# Patient Record
Sex: FEMALE | Race: OTHER | NOT HISPANIC OR LATINO
[De-identification: names, ages, dates, MRNs, and addresses within clinical notes are randomized per-mention and may not be internally consistent; named-entity substitution may affect disease eponyms.]

---

## 2024-10-24 ENCOUNTER — ASOB RESULT (OUTPATIENT)
Age: 29
End: 2024-10-24

## 2024-10-24 ENCOUNTER — APPOINTMENT (OUTPATIENT)
Dept: ANTEPARTUM | Facility: CLINIC | Age: 29
End: 2024-10-24
Payer: MEDICAID

## 2024-10-24 PROCEDURE — 76811 OB US DETAILED SNGL FETUS: CPT

## 2024-10-24 PROCEDURE — 76817 TRANSVAGINAL US OBSTETRIC: CPT | Mod: 59

## 2025-01-02 ENCOUNTER — APPOINTMENT (OUTPATIENT)
Dept: ANTEPARTUM | Facility: CLINIC | Age: 30
End: 2025-01-02

## 2025-03-12 ENCOUNTER — INPATIENT (INPATIENT)
Facility: HOSPITAL | Age: 30
LOS: 3 days | Discharge: HOME | End: 2025-03-16
Attending: OBSTETRICS & GYNECOLOGY | Admitting: OBSTETRICS & GYNECOLOGY
Payer: MEDICAID

## 2025-03-12 VITALS
HEART RATE: 84 BPM | TEMPERATURE: 98 F | OXYGEN SATURATION: 100 % | WEIGHT: 145.06 LBS | DIASTOLIC BLOOD PRESSURE: 94 MMHG | SYSTOLIC BLOOD PRESSURE: 125 MMHG | HEIGHT: 60 IN | RESPIRATION RATE: 16 BRPM

## 2025-03-12 LAB
ALBUMIN SERPL ELPH-MCNC: 3.6 G/DL — SIGNIFICANT CHANGE UP (ref 3.3–5)
ALP SERPL-CCNC: 237 U/L — HIGH (ref 40–120)
ALT FLD-CCNC: 9 U/L — LOW (ref 10–45)
ANION GAP SERPL CALC-SCNC: 13 MMOL/L — SIGNIFICANT CHANGE UP (ref 5–17)
APTT BLD: 25.3 SEC — SIGNIFICANT CHANGE UP (ref 24.5–35.6)
AST SERPL-CCNC: 16 U/L — SIGNIFICANT CHANGE UP (ref 10–40)
BASOPHILS # BLD AUTO: 0.02 K/UL — SIGNIFICANT CHANGE UP (ref 0–0.2)
BASOPHILS NFR BLD AUTO: 0.2 % — SIGNIFICANT CHANGE UP (ref 0–2)
BILIRUB SERPL-MCNC: 0.2 MG/DL — SIGNIFICANT CHANGE UP (ref 0.2–1.2)
BUN SERPL-MCNC: 7 MG/DL — SIGNIFICANT CHANGE UP (ref 7–23)
CALCIUM SERPL-MCNC: 8.8 MG/DL — SIGNIFICANT CHANGE UP (ref 8.4–10.5)
CHLORIDE SERPL-SCNC: 102 MMOL/L — SIGNIFICANT CHANGE UP (ref 96–108)
CO2 SERPL-SCNC: 20 MMOL/L — LOW (ref 22–31)
CREAT ?TM UR-MCNC: 26 MG/DL — SIGNIFICANT CHANGE UP
CREAT SERPL-MCNC: 0.5 MG/DL — SIGNIFICANT CHANGE UP (ref 0.5–1.3)
EGFR: 130 ML/MIN/1.73M2 — SIGNIFICANT CHANGE UP
EGFR: 130 ML/MIN/1.73M2 — SIGNIFICANT CHANGE UP
EOSINOPHIL # BLD AUTO: 0.03 K/UL — SIGNIFICANT CHANGE UP (ref 0–0.5)
EOSINOPHIL NFR BLD AUTO: 0.3 % — SIGNIFICANT CHANGE UP (ref 0–6)
GLUCOSE SERPL-MCNC: 80 MG/DL — SIGNIFICANT CHANGE UP (ref 70–99)
HCT VFR BLD CALC: 32.7 % — LOW (ref 34.5–45)
HGB BLD-MCNC: 10.4 G/DL — LOW (ref 11.5–15.5)
IMM GRANULOCYTES NFR BLD AUTO: 0.8 % — SIGNIFICANT CHANGE UP (ref 0–0.9)
INR BLD: 0.86 — SIGNIFICANT CHANGE UP (ref 0.85–1.16)
LDH SERPL L TO P-CCNC: 183 U/L — SIGNIFICANT CHANGE UP (ref 50–242)
LYMPHOCYTES # BLD AUTO: 1.95 K/UL — SIGNIFICANT CHANGE UP (ref 1–3.3)
LYMPHOCYTES # BLD AUTO: 19.7 % — SIGNIFICANT CHANGE UP (ref 13–44)
MCHC RBC-ENTMCNC: 25.7 PG — LOW (ref 27–34)
MCHC RBC-ENTMCNC: 31.8 G/DL — LOW (ref 32–36)
MCV RBC AUTO: 80.9 FL — SIGNIFICANT CHANGE UP (ref 80–100)
MONOCYTES # BLD AUTO: 0.52 K/UL — SIGNIFICANT CHANGE UP (ref 0–0.9)
MONOCYTES NFR BLD AUTO: 5.2 % — SIGNIFICANT CHANGE UP (ref 2–14)
NEUTROPHILS # BLD AUTO: 7.32 K/UL — SIGNIFICANT CHANGE UP (ref 1.8–7.4)
NEUTROPHILS NFR BLD AUTO: 73.8 % — SIGNIFICANT CHANGE UP (ref 43–77)
NRBC BLD AUTO-RTO: 0 /100 WBCS — SIGNIFICANT CHANGE UP (ref 0–0)
PLATELET # BLD AUTO: 204 K/UL — SIGNIFICANT CHANGE UP (ref 150–400)
POTASSIUM SERPL-MCNC: 3.9 MMOL/L — SIGNIFICANT CHANGE UP (ref 3.5–5.3)
POTASSIUM SERPL-SCNC: 3.9 MMOL/L — SIGNIFICANT CHANGE UP (ref 3.5–5.3)
PROT ?TM UR-MCNC: 27 MG/DL — HIGH (ref 0–12)
PROT SERPL-MCNC: 7.1 G/DL — SIGNIFICANT CHANGE UP (ref 6–8.3)
PROT/CREAT UR-RTO: 1 RATIO — HIGH (ref 0–0.2)
PROTHROM AB SERPL-ACNC: 10.1 SEC — SIGNIFICANT CHANGE UP (ref 9.9–13.4)
RBC # BLD: 4.04 M/UL — SIGNIFICANT CHANGE UP (ref 3.8–5.2)
RBC # FLD: 13.9 % — SIGNIFICANT CHANGE UP (ref 10.3–14.5)
RH IG SCN BLD-IMP: POSITIVE — SIGNIFICANT CHANGE UP
RH IG SCN BLD-IMP: POSITIVE — SIGNIFICANT CHANGE UP
SODIUM SERPL-SCNC: 135 MMOL/L — SIGNIFICANT CHANGE UP (ref 135–145)
T PALLIDUM AB TITR SER: NEGATIVE — SIGNIFICANT CHANGE UP
URATE SERPL-MCNC: 4.4 MG/DL — SIGNIFICANT CHANGE UP (ref 2.5–7)
WBC # BLD: 9.92 K/UL — SIGNIFICANT CHANGE UP (ref 3.8–10.5)
WBC # FLD AUTO: 9.92 K/UL — SIGNIFICANT CHANGE UP (ref 3.8–10.5)

## 2025-03-12 RX ORDER — OXYTOCIN-SODIUM CHLORIDE 0.9% IV SOLN 30 UNIT/500ML 30-0.9/5 UT/ML-%
SOLUTION INTRAVENOUS
Qty: 30 | Refills: 0 | Status: DISCONTINUED | OUTPATIENT
Start: 2025-03-12 | End: 2025-03-13

## 2025-03-12 RX ORDER — SODIUM CHLORIDE 9 G/1000ML
1000 INJECTION, SOLUTION INTRAVENOUS
Refills: 0 | Status: DISCONTINUED | OUTPATIENT
Start: 2025-03-12 | End: 2025-03-13

## 2025-03-12 RX ORDER — FENTANYL/BUPIVACAINE/NS/PF 2MCG/ML-.1
250 PLASTIC BAG, INJECTION (ML) INJECTION
Refills: 0 | Status: DISCONTINUED | OUTPATIENT
Start: 2025-03-12 | End: 2025-03-12

## 2025-03-12 RX ORDER — CITRIC ACID/SODIUM CITRATE 300-500 MG
15 SOLUTION, ORAL ORAL EVERY 6 HOURS
Refills: 0 | Status: DISCONTINUED | OUTPATIENT
Start: 2025-03-12 | End: 2025-03-13

## 2025-03-12 RX ORDER — OXYTOCIN-SODIUM CHLORIDE 0.9% IV SOLN 30 UNIT/500ML 30-0.9/5 UT/ML-%
167 SOLUTION INTRAVENOUS
Qty: 30 | Refills: 0 | Status: DISCONTINUED | OUTPATIENT
Start: 2025-03-12 | End: 2025-03-13

## 2025-03-12 RX ORDER — INFLUENZA A VIRUS A/IDAHO/07/2018 (H1N1) ANTIGEN (MDCK CELL DERIVED, PROPIOLACTONE INACTIVATED, INFLUENZA A VIRUS A/INDIANA/08/2018 (H3N2) ANTIGEN (MDCK CELL DERIVED, PROPIOLACTONE INACTIVATED), INFLUENZA B VIRUS B/SINGAPORE/INFTT-16-0610/2016 ANTIGEN (MDCK CELL DERIVED, PROPIOLACTONE INACTIVATED), INFLUENZA B VIRUS B/IOWA/06/2017 ANTIGEN (MDCK CELL DERIVED, PROPIOLACTONE INACTIVATED) 15; 15; 15; 15 UG/.5ML; UG/.5ML; UG/.5ML; UG/.5ML
0.5 INJECTION, SUSPENSION INTRAMUSCULAR ONCE
Refills: 0 | Status: DISCONTINUED | OUTPATIENT
Start: 2025-03-12 | End: 2025-03-16

## 2025-03-12 RX ADMIN — SODIUM CHLORIDE 125 MILLILITER(S): 9 INJECTION, SOLUTION INTRAVENOUS at 08:16

## 2025-03-12 RX ADMIN — OXYTOCIN-SODIUM CHLORIDE 0.9% IV SOLN 30 UNIT/500ML 2 MILLIUNIT(S)/MIN: 30-0.9/5 SOLUTION at 09:43

## 2025-03-12 NOTE — OB RN PATIENT PROFILE - FALL HARM RISK - UNIVERSAL INTERVENTIONS
Bed in lowest position, wheels locked, appropriate side rails in place/Call bell, personal items and telephone in reach/Instruct patient to call for assistance before getting out of bed or chair/Non-slip footwear when patient is out of bed/[place] to call system/Physically safe environment - no spills, clutter or unnecessary equipment/Purposeful Proactive Rounding/Room/bathroom lighting operational, light cord in reach

## 2025-03-12 NOTE — OB PROVIDER H&P - HISTORY OF PRESENT ILLNESS
IVANIA [id number]  S: 29y  at 40w0d presenting for elective IOL. Pt reports +FM and irregular contractions, denies LOF, ROM, and vaginal bleeding.     Antepartum  Spontaneous, NIPT wnl, anatomy scan wnl, passed GCT, GBS negative, EFW 3100g    Ob: : medical VTOP in   G2: medical VTOT in   G3: SAB in   G4: current   GYN: hx of ovarian cysts, hospitalized for rupture at age 18; denies hx abnormal paps, uterine fibroids, STIs  PMHx: denies  Surg: denies  Meds: denies  No Known Allergies      PE  General: NAD  Abdomen: Soft; Nontender; Gravid  SVE:     NST: baseline , mod variability, +accels, -decels, cat I  [place]: contractions q2-3min  TAUS:        IVANIA [id number]  S: 29y  at 40w0d presenting for elective IOL. Pt reports +FM and irregular contractions, denies LOF, ROM, and vaginal bleeding. Pt denies hx of gHTN and cHTN, denies HA, blurry vision, scotoma, RUQ, epigastric pain.     Antepartum  Spontaneous, NIPT wnl, anatomy scan wnl, passed GCT, GBS negative, EFW 3100g    Ob: : medical VTOP in   G2: medical VTOT in   G3: SAB in   G4: current   GYN: hx of ovarian cysts, hospitalized for rupture at age 18; denies hx abnormal paps, uterine fibroids, STIs  PMHx: denies  Surg: denies  Meds: denies  No Known Allergies      PE  General: NAD  Abdomen: Soft; Nontender; Gravid  SVE:     NST: baseline , mod variability, +accels, -decels, cat I  [place]: contractions q2-3min  TAUS: cephalic presentation

## 2025-03-12 NOTE — OB PROVIDER H&P - ASSESSMENT
A/P: 29y  at 40w0d presenting for elective IOL.  - Admit to L&D  - NPO and IVF  - Routine labs. Prenatal labs reviewed, as above.   - Mild range BPs x 2, will send full HTN labs, no toxic complaints  - GBS negative, no abx indicated  - Fetal status reassuring, continuous efm and toco.   - Plan for IOL pending SVE    Will d/w Dr. Francisco

## 2025-03-13 PROCEDURE — 88307 TISSUE EXAM BY PATHOLOGIST: CPT | Mod: 26

## 2025-03-13 RX ORDER — CLOSTRIDIUM TETANI TOXOID ANTIGEN (FORMALDEHYDE INACTIVATED), CORYNEBACTERIUM DIPHTHERIAE TOXOID ANTIGEN (FORMALDEHYDE INACTIVATED), BORDETELLA PERTUSSIS TOXOID ANTIGEN (GLUTARALDEHYDE INACTIVATED), BORDETELLA PERTUSSIS FILAMENTOUS HEMAGGLUTININ ANTIGEN (FORMALDEHYDE INACTIVATED), BORDETELLA PERTUSSIS PERTACTIN ANTIGEN, AND BORDETELLA PERTUSSIS FIMBRIAE 2/3 ANTIGEN 5; 2; 2.5; 5; 3; 5 [LF]/.5ML; [LF]/.5ML; UG/.5ML; UG/.5ML; UG/.5ML; UG/.5ML
0.5 INJECTION, SUSPENSION INTRAMUSCULAR ONCE
Refills: 0 | Status: DISCONTINUED | OUTPATIENT
Start: 2025-03-13 | End: 2025-03-16

## 2025-03-13 RX ORDER — DIPHENHYDRAMINE HCL 12.5MG/5ML
25 ELIXIR ORAL EVERY 6 HOURS
Refills: 0 | Status: DISCONTINUED | OUTPATIENT
Start: 2025-03-13 | End: 2025-03-16

## 2025-03-13 RX ORDER — OXYCODONE HYDROCHLORIDE 30 MG/1
5 TABLET ORAL
Refills: 0 | Status: COMPLETED | OUTPATIENT
Start: 2025-03-13 | End: 2025-03-20

## 2025-03-13 RX ORDER — ENOXAPARIN SODIUM 100 MG/ML
40 INJECTION SUBCUTANEOUS EVERY 24 HOURS
Refills: 0 | Status: DISCONTINUED | OUTPATIENT
Start: 2025-03-13 | End: 2025-03-16

## 2025-03-13 RX ORDER — OXYCODONE HYDROCHLORIDE 30 MG/1
5 TABLET ORAL ONCE
Refills: 0 | Status: DISCONTINUED | OUTPATIENT
Start: 2025-03-13 | End: 2025-03-16

## 2025-03-13 RX ORDER — IBUPROFEN 200 MG
600 TABLET ORAL EVERY 6 HOURS
Refills: 0 | Status: DISCONTINUED | OUTPATIENT
Start: 2025-03-13 | End: 2025-03-16

## 2025-03-13 RX ORDER — MODIFIED LANOLIN 100 %
1 CREAM (GRAM) TOPICAL EVERY 6 HOURS
Refills: 0 | Status: DISCONTINUED | OUTPATIENT
Start: 2025-03-13 | End: 2025-03-16

## 2025-03-13 RX ORDER — OXYTOCIN-SODIUM CHLORIDE 0.9% IV SOLN 30 UNIT/500ML 30-0.9/5 UT/ML-%
42 SOLUTION INTRAVENOUS
Qty: 30 | Refills: 0 | Status: DISCONTINUED | OUTPATIENT
Start: 2025-03-13 | End: 2025-03-16

## 2025-03-13 RX ORDER — KETOROLAC TROMETHAMINE 30 MG/ML
30 INJECTION, SOLUTION INTRAMUSCULAR; INTRAVENOUS EVERY 6 HOURS
Refills: 0 | Status: DISCONTINUED | OUTPATIENT
Start: 2025-03-13 | End: 2025-03-14

## 2025-03-13 RX ORDER — SODIUM CHLORIDE 9 G/1000ML
1000 INJECTION, SOLUTION INTRAVENOUS
Refills: 0 | Status: DISCONTINUED | OUTPATIENT
Start: 2025-03-13 | End: 2025-03-16

## 2025-03-13 RX ORDER — SIMETHICONE 80 MG
80 TABLET,CHEWABLE ORAL EVERY 4 HOURS
Refills: 0 | Status: DISCONTINUED | OUTPATIENT
Start: 2025-03-13 | End: 2025-03-16

## 2025-03-13 RX ORDER — ACETAMINOPHEN 500 MG/5ML
975 LIQUID (ML) ORAL
Refills: 0 | Status: DISCONTINUED | OUTPATIENT
Start: 2025-03-13 | End: 2025-03-16

## 2025-03-13 RX ORDER — IBUPROFEN 200 MG
600 TABLET ORAL EVERY 6 HOURS
Refills: 0 | Status: COMPLETED | OUTPATIENT
Start: 2025-03-13 | End: 2026-02-09

## 2025-03-13 RX ORDER — CITRIC ACID/SODIUM CITRATE 300-500 MG
30 SOLUTION, ORAL ORAL ONCE
Refills: 0 | Status: COMPLETED | OUTPATIENT
Start: 2025-03-13 | End: 2025-03-13

## 2025-03-13 RX ORDER — CEFAZOLIN SODIUM IN 0.9 % NACL 3 G/100 ML
2000 INTRAVENOUS SOLUTION, PIGGYBACK (ML) INTRAVENOUS ONCE
Refills: 0 | Status: COMPLETED | OUTPATIENT
Start: 2025-03-13 | End: 2025-03-13

## 2025-03-13 RX ORDER — AZITHROMYCIN 250 MG
500 CAPSULE ORAL ONCE
Refills: 0 | Status: COMPLETED | OUTPATIENT
Start: 2025-03-13 | End: 2025-03-13

## 2025-03-13 RX ORDER — MAGNESIUM HYDROXIDE 400 MG/5ML
30 SUSPENSION ORAL
Refills: 0 | Status: DISCONTINUED | OUTPATIENT
Start: 2025-03-13 | End: 2025-03-16

## 2025-03-13 RX ADMIN — Medication 975 MILLIGRAM(S): at 09:15

## 2025-03-13 RX ADMIN — Medication 975 MILLIGRAM(S): at 21:00

## 2025-03-13 RX ADMIN — Medication 80 MILLIGRAM(S): at 08:43

## 2025-03-13 RX ADMIN — KETOROLAC TROMETHAMINE 30 MILLIGRAM(S): 30 INJECTION, SOLUTION INTRAMUSCULAR; INTRAVENOUS at 17:30

## 2025-03-13 RX ADMIN — Medication 975 MILLIGRAM(S): at 14:55

## 2025-03-13 RX ADMIN — KETOROLAC TROMETHAMINE 30 MILLIGRAM(S): 30 INJECTION, SOLUTION INTRAMUSCULAR; INTRAVENOUS at 11:32

## 2025-03-13 RX ADMIN — Medication 20 MILLIGRAM(S): at 03:00

## 2025-03-13 RX ADMIN — KETOROLAC TROMETHAMINE 30 MILLIGRAM(S): 30 INJECTION, SOLUTION INTRAMUSCULAR; INTRAVENOUS at 18:00

## 2025-03-13 RX ADMIN — KETOROLAC TROMETHAMINE 30 MILLIGRAM(S): 30 INJECTION, SOLUTION INTRAMUSCULAR; INTRAVENOUS at 12:00

## 2025-03-13 RX ADMIN — KETOROLAC TROMETHAMINE 30 MILLIGRAM(S): 30 INJECTION, SOLUTION INTRAMUSCULAR; INTRAVENOUS at 05:00

## 2025-03-13 RX ADMIN — Medication 975 MILLIGRAM(S): at 08:42

## 2025-03-13 RX ADMIN — KETOROLAC TROMETHAMINE 30 MILLIGRAM(S): 30 INJECTION, SOLUTION INTRAMUSCULAR; INTRAVENOUS at 04:59

## 2025-03-13 RX ADMIN — ENOXAPARIN SODIUM 40 MILLIGRAM(S): 100 INJECTION SUBCUTANEOUS at 17:42

## 2025-03-13 RX ADMIN — Medication 30 MILLILITER(S): at 03:52

## 2025-03-13 RX ADMIN — OXYTOCIN-SODIUM CHLORIDE 0.9% IV SOLN 30 UNIT/500ML 42 MILLIUNIT(S)/MIN: 30-0.9/5 SOLUTION at 04:25

## 2025-03-13 RX ADMIN — Medication 0.2 MILLIGRAM(S): at 03:38

## 2025-03-13 RX ADMIN — Medication 100 MILLIGRAM(S): at 03:00

## 2025-03-13 RX ADMIN — Medication 80 MILLIGRAM(S): at 14:26

## 2025-03-13 RX ADMIN — Medication 255 MILLIGRAM(S): at 03:10

## 2025-03-13 RX ADMIN — SODIUM CHLORIDE 125 MILLILITER(S): 9 INJECTION, SOLUTION INTRAVENOUS at 04:30

## 2025-03-13 RX ADMIN — Medication 975 MILLIGRAM(S): at 14:25

## 2025-03-13 NOTE — OB PROVIDER LABOR PROGRESS NOTE - NS_OBIHIFHRDETAILS_OBGYN_ALL_OB_FT
baseline , mod variability, +accels, -decels, cat I
baseline 130bpm, moderate variability, +accels, no decels
baseline 135bpm, moderate variability, +accels, no further decels, Tracing category 1.
Cat I: 140bpm, mod variability, +accels, -decels
baseline 135bpm, moderate variability, +Accels, 2 decels noted. Overall tracing is reassuring due to moderate variability.
baseline 140bpm, moderate variability, +Accels, no decels
FHT Category I with moderate variability and accelerations, no decelerations.
baseline , mod variability, +accels, -decels, cat I

## 2025-03-13 NOTE — OB PROVIDER DELIVERY SUMMARY - NSPROVIDERDELIVERYNOTE_OBGYN_ALL_OB_FT
Low transverse  for arrest of descent. Fetus in cephalic presentation. Fluid clear. No difficulty with delivery. Uterus closed in one layer with vicryl. Fascia closed with stratafix. Subcutaneous closed in two layers with stratafix. Skin closed with monocryl. .  . . Patient received double pitocin and methergine for atony with improvement in tone.

## 2025-03-13 NOTE — OB PROVIDER DELIVERY SUMMARY - NSSELHIDDEN_OBGYN_ALL_OB_FT
[NS_DeliveryAttending1_OBGYN_ALL_OB_FT:[id number]==],[NS_DeliveryAssist1_OBGYN_ALL_OB_FT:[id number]=],[NS_DeliveryRN_OBGYN_ALL_OB_FT:[id number]=]

## 2025-03-13 NOTE — OB PROVIDER LABOR PROGRESS NOTE - NS_SUBJECTIVE/OBJECTIVE_OBGYN_ALL_OB_FT
EFM reviewed
Patient fully and pushing; station is about 0.  EFM reviewed.  Baseline rate is 145 bpm, mod destini, + accels, - decels  Ctx q2 mins  Cat I tracing  Continue with pushing
Patient seen bedside for AROM. AROM @ 20:33 for blood tinged fluid. VE 5/80/-2.  EFM reviewed.
Patient overall comfortable.  Pitocin at 6mu.
Patient seen bedside because she was feeling rectal pressure. VE Fully/+1.  EFM reviewed.
Pt examined, SVE 3-5/50/-3 with a bulging bag.
EFM reviewed.
EFM reviewed.  Epidural in situ.
Pt examined, SVE 5/50/-3. Pt comfortable with epidural.

## 2025-03-13 NOTE — OB RN DELIVERY SUMMARY - NSSELHIDDEN_OBGYN_ALL_OB_FT
[NS_DeliveryAttending1_OBGYN_ALL_OB_FT:[id number]==],[NS_DeliveryAssist1_OBGYN_ALL_OB_FT:[id number]=],[NS_DeliveryRN_OBGYN_ALL_OB_FT:[id number]=]

## 2025-03-13 NOTE — OB PROVIDER LABOR PROGRESS NOTE - ASSESSMENT
Pitocin at 8mu. Continue current managment. 
Plan:  - continuous maternal and fetal monitoring  - pitocin currently running at 14mu/min, will increase as tolerated
Plan:  - will continue to monitor maternal and fetal status closely  - pitocin currently runninga t 26mu/min, will increase as tolerated  - will reposition patient as needed
- Continue current management
Plan for pitocin. 
-Pt fully dilated and to start pushing. anticipate , attending in house  -Continue to monitor and titrate pitocin    Regina Smith PA-C
Plan:  - continuous maternal and fetal monitoring  - pitocin currently running at 18mu/min, will increase as tolerated
Plan:  - patient full dilated and to start pushing with Dr. Francisco shortly    Dr. Francisco aware

## 2025-03-13 NOTE — OB PROVIDER LABOR PROGRESS NOTE - NS_OBIHICONTRACTIONPATTERNDETAILS_OBGYN_ALL_OB_FT
Contractions q3-6min
contractions q1.5 minutes
contractions q3-4min
Inverted ctx q 2-3 min on 18Mu of pitocin
contractions q2 minutes
contractions q2-3min
contractions q2-3 minutes
contractions q3-6 minutes

## 2025-03-13 NOTE — OB RN DELIVERY SUMMARY - NS_SEPSISRSKCALC_OBGYN_ALL_OB_FT
EOS calculated successfully. EOS Risk Factor: 0.5/1000 live births ([facility] national incidence); GA=40w1d; Temp=99.1; ROM=7; GBS='Negative'; Antibiotics='Broad spectrum antibiotics 2-3.9 hrs prior to birth'

## 2025-03-13 NOTE — LACTATION INITIAL EVALUATION - LACTATION INTERVENTIONS
initiate/review safe skin-to-skin/initiate/review hand expression/initiate/review pumping guidelines and safe milk handling/post discharge community resources provided/initiate/review supplementation plan due to medical indications/review techniques to increase milk supply/reviewed components of an effective feeding and at least 8 effective feedings per day required/reviewed importance of monitoring infant diapers, the breastfeeding log, and minimum output each day/reviewed risks of artificial nipples/reviewed benefits and recommendations for rooming in/reviewed feeding on demand/by cue at least 8 times a day/reviewed indications of inadequate milk transfer that would require supplementation

## 2025-03-13 NOTE — LACTATION INITIAL EVALUATION - NS LACT CON REASON FOR REQ
Per maternal request breast pump brought to bedside and instructions for use taught, flanges sized for patient. Pump protocol reviewed and implementing hand expression also encouraged. Mother understands supplementation with formula is likely necessary as a temporary measure until baby able to transfer adequate amounts of milk from breast. Paced-bottle feeding with a slow flow nipple feeding was recommended. Pt declined latch assistance at this time and preferred to offer infant formula feedings. Pt verbalized understanding instructions and is agreeable with recommendations. Pt primary nurse updated on pt status and plan./inverted/flat nipples/primaparous mom/staff request/patient request

## 2025-03-13 NOTE — OB RN INTRAOPERATIVE NOTE - NSSELHIDDEN_OBGYN_ALL_OB_FT
[NS_DeliveryAttending1_OBGYN_ALL_OB_FT:[id number]],[NS_DeliveryAssist1_OBGYN_ALL_OB_FT:[id number]],[NS_DeliveryRN_OBGYN_ALL_OB_FT:[id number]=]

## 2025-03-14 PROBLEM — Z00.00 ENCOUNTER FOR PREVENTIVE HEALTH EXAMINATION: Status: ACTIVE | Noted: 2025-03-14

## 2025-03-14 LAB
BASOPHILS NFR BLD AUTO: 0 % — SIGNIFICANT CHANGE UP (ref 0–2)
EOSINOPHIL # BLD AUTO: 0 K/UL — SIGNIFICANT CHANGE UP (ref 0–0.5)
EOSINOPHIL NFR BLD AUTO: 0 % — SIGNIFICANT CHANGE UP (ref 0–6)
HGB BLD-MCNC: 6.5 G/DL — CRITICAL LOW (ref 11.5–15.5)
LYMPHOCYTES # BLD AUTO: 1.5 K/UL — SIGNIFICANT CHANGE UP (ref 1–3.3)
LYMPHOCYTES # BLD AUTO: 12.2 % — LOW (ref 13–44)
MCHC RBC-ENTMCNC: 31.3 G/DL — LOW (ref 32–36)
MCV RBC AUTO: 83.9 FL — SIGNIFICANT CHANGE UP (ref 80–100)
MONOCYTES # BLD AUTO: 0 K/UL — SIGNIFICANT CHANGE UP (ref 0–0.9)
MONOCYTES NFR BLD AUTO: 0 % — LOW (ref 2–14)
NEUTROPHILS # BLD AUTO: 10.77 K/UL — HIGH (ref 1.8–7.4)
PLATELET # BLD AUTO: 154 K/UL — SIGNIFICANT CHANGE UP (ref 150–400)
RBC # BLD: 2.48 M/UL — LOW (ref 3.8–5.2)
RBC # FLD: 14.2 % — SIGNIFICANT CHANGE UP (ref 10.3–14.5)
WBC # BLD: 12.27 K/UL — HIGH (ref 3.8–10.5)
WBC # FLD AUTO: 12.27 K/UL — HIGH (ref 3.8–10.5)

## 2025-03-14 RX ORDER — OXYCODONE HYDROCHLORIDE 30 MG/1
5 TABLET ORAL
Refills: 0 | Status: DISCONTINUED | OUTPATIENT
Start: 2025-03-14 | End: 2025-03-16

## 2025-03-14 RX ORDER — IRON SUCROSE 20 MG/ML
300 INJECTION, SOLUTION INTRAVENOUS EVERY 24 HOURS
Refills: 0 | Status: COMPLETED | OUTPATIENT
Start: 2025-03-14 | End: 2025-03-16

## 2025-03-14 RX ADMIN — OXYCODONE HYDROCHLORIDE 5 MILLIGRAM(S): 30 TABLET ORAL at 14:40

## 2025-03-14 RX ADMIN — Medication 600 MILLIGRAM(S): at 17:53

## 2025-03-14 RX ADMIN — Medication 600 MILLIGRAM(S): at 12:05

## 2025-03-14 RX ADMIN — KETOROLAC TROMETHAMINE 30 MILLIGRAM(S): 30 INJECTION, SOLUTION INTRAMUSCULAR; INTRAVENOUS at 00:00

## 2025-03-14 RX ADMIN — OXYCODONE HYDROCHLORIDE 5 MILLIGRAM(S): 30 TABLET ORAL at 23:32

## 2025-03-14 RX ADMIN — Medication 80 MILLIGRAM(S): at 12:10

## 2025-03-14 RX ADMIN — Medication 975 MILLIGRAM(S): at 09:40

## 2025-03-14 RX ADMIN — Medication 600 MILLIGRAM(S): at 18:50

## 2025-03-14 RX ADMIN — ENOXAPARIN SODIUM 40 MILLIGRAM(S): 100 INJECTION SUBCUTANEOUS at 15:01

## 2025-03-14 RX ADMIN — Medication 600 MILLIGRAM(S): at 23:45

## 2025-03-14 RX ADMIN — Medication 975 MILLIGRAM(S): at 15:50

## 2025-03-14 RX ADMIN — Medication 600 MILLIGRAM(S): at 05:40

## 2025-03-14 RX ADMIN — Medication 975 MILLIGRAM(S): at 02:30

## 2025-03-14 RX ADMIN — Medication 975 MILLIGRAM(S): at 08:44

## 2025-03-14 RX ADMIN — Medication 975 MILLIGRAM(S): at 14:49

## 2025-03-14 RX ADMIN — Medication 975 MILLIGRAM(S): at 20:43

## 2025-03-14 RX ADMIN — OXYCODONE HYDROCHLORIDE 5 MILLIGRAM(S): 30 TABLET ORAL at 13:38

## 2025-03-14 RX ADMIN — Medication 80 MILLIGRAM(S): at 17:53

## 2025-03-14 RX ADMIN — Medication 600 MILLIGRAM(S): at 13:05

## 2025-03-14 RX ADMIN — IRON SUCROSE 176.67 MILLIGRAM(S): 20 INJECTION, SOLUTION INTRAVENOUS at 10:45

## 2025-03-15 RX ADMIN — Medication 600 MILLIGRAM(S): at 11:46

## 2025-03-15 RX ADMIN — OXYCODONE HYDROCHLORIDE 5 MILLIGRAM(S): 30 TABLET ORAL at 00:00

## 2025-03-15 RX ADMIN — Medication 975 MILLIGRAM(S): at 02:30

## 2025-03-15 RX ADMIN — Medication 600 MILLIGRAM(S): at 12:15

## 2025-03-15 RX ADMIN — Medication 975 MILLIGRAM(S): at 14:33

## 2025-03-15 RX ADMIN — Medication 600 MILLIGRAM(S): at 23:44

## 2025-03-15 RX ADMIN — Medication 975 MILLIGRAM(S): at 20:19

## 2025-03-15 RX ADMIN — Medication 600 MILLIGRAM(S): at 05:24

## 2025-03-15 RX ADMIN — Medication 975 MILLIGRAM(S): at 08:25

## 2025-03-15 RX ADMIN — IRON SUCROSE 176.67 MILLIGRAM(S): 20 INJECTION, SOLUTION INTRAVENOUS at 09:15

## 2025-03-15 RX ADMIN — MAGNESIUM HYDROXIDE 30 MILLILITER(S): 400 SUSPENSION ORAL at 20:19

## 2025-03-15 RX ADMIN — Medication 600 MILLIGRAM(S): at 18:05

## 2025-03-15 RX ADMIN — Medication 600 MILLIGRAM(S): at 17:35

## 2025-03-15 RX ADMIN — Medication 975 MILLIGRAM(S): at 09:00

## 2025-03-15 RX ADMIN — ENOXAPARIN SODIUM 40 MILLIGRAM(S): 100 INJECTION SUBCUTANEOUS at 15:22

## 2025-03-15 RX ADMIN — Medication 975 MILLIGRAM(S): at 15:00

## 2025-03-16 VITALS
DIASTOLIC BLOOD PRESSURE: 81 MMHG | RESPIRATION RATE: 18 BRPM | HEART RATE: 72 BPM | TEMPERATURE: 97 F | OXYGEN SATURATION: 98 % | SYSTOLIC BLOOD PRESSURE: 124 MMHG

## 2025-03-16 RX ORDER — ACETAMINOPHEN 500 MG/5ML
3 LIQUID (ML) ORAL
Qty: 0 | Refills: 0 | DISCHARGE
Start: 2025-03-16

## 2025-03-16 RX ORDER — IBUPROFEN 200 MG
1 TABLET ORAL
Qty: 0 | Refills: 0 | DISCHARGE
Start: 2025-03-16

## 2025-03-16 RX ADMIN — Medication 975 MILLIGRAM(S): at 08:44

## 2025-03-16 RX ADMIN — Medication 600 MILLIGRAM(S): at 12:00

## 2025-03-16 RX ADMIN — Medication 975 MILLIGRAM(S): at 09:15

## 2025-03-16 RX ADMIN — Medication 975 MILLIGRAM(S): at 02:52

## 2025-03-16 RX ADMIN — Medication 600 MILLIGRAM(S): at 11:23

## 2025-03-16 RX ADMIN — Medication 600 MILLIGRAM(S): at 06:02

## 2025-03-16 NOTE — DISCHARGE NOTE OB - FINANCIAL ASSISTANCE
[facility] provides services at a reduced cost to those who are determined to be eligible through [facility]’s financial assistance program. Information regarding [facility]’s financial assistance program can be found by going to [email or web address] or by calling [phone].

## 2025-03-16 NOTE — PROGRESS NOTE ADULT - ASSESSMENT
A/P: 29y s/p primary  section for arrest of descent, POD#3 c/b preeclampsia without severe features, stable  -  Pain: PO motrin q6hrs, tylenol q8hrs, oxycodone for severe pain PRN  - preeclampsia without severe features: normotensive overnight, continue to monitor  -  Post-operatively labs: post-op Hgb 6.4, hemodynamically stable, acute blood loss anemia due to blood loss in surgery, asymptomatic on IV iron; no blood transfusion per attending   -  GI: tolerating regular diet, passing gas  -  : s/p mancilla , urinating without difficulty  -  DVT prophylaxis: encouraged increased ambulation, SCDs, SQL  -  Dispo: POD 3 or 4
29y Female POD#1  s/p primary C/S for arrest of descent, Uncomplicated w/ PEC w/o SF                                       - Neuro/Pain:  toradol atc, tylenol atc, oxy prn  - CV:  VS per routine, AM CBC pending  - Pulm: Encourage ISS & Ambulation  - GI: Advance as tolerated  - : voiding  - DVT ppx: SCDs, Lovenox 40mg QD  - Dispo: POD #2/3    PEC w/o SF  -no toxic complaints  -normotensive
A/P: 29y s/p  section, POD#2 w/ PEC w/o SF, stable  -  Pain: PO motrin q6hrs, tylenol q8hrs, oxycodone for severe pain PRN  -  Post-operatively labs: post-op Hgb 6.5 due to acute blood loss anemia, on IV iron, hemodynamically stable, no symptoms of anemia   -  GI: tolerating regular diet, passing gas  -  : s/p mancilla , urinating without difficulty  -  DVT prophylaxis: encouraged increased ambulation, SCDs, SQL  -  Dispo: POD 3 or 4    PEC w/o SF  -no toxic complaints  -normotensive
POD#1 sp  for arrest  1-ambulation  2-reg diet

## 2025-03-16 NOTE — DISCHARGE NOTE OB - HOSPITAL COURSE
Patient underwent an uncomplicated LTCS @40w1d for arrest of dilation after IOL . Course complicated by preeclampsia without severe features. Patient’s postoperative course was unremarkable and she remained hemodynamically stable and afebrile throughout. Upon discharge on POD3, the patient is ambulating and voiding spontaneously, tolerating oral intake, pain was well controlled with oral medication, and vital signs were stable.

## 2025-03-16 NOTE — DISCHARGE NOTE OB - CARE PROVIDER_API CALL
Jhoan Francisco  Obstetrics and Gynecology  [address] [place] [address], [address]  [place] [address]  Phone: [phone]  Fax: [phone]  Follow Up Time:

## 2025-03-16 NOTE — DISCHARGE NOTE OB - PLAN OF CARE
# Monitor blood pressure three times a day. Please document the blood pressure values to review with obstetrician at follow-up appointment.   # If your blood pressure is equal to or greater than 160/110 (either one) OR if you experience any of the following symptoms: changes in vision, headache not relieved with Tylenol, severe abdominal pain, vomiting, increased vaginal bleeding, chest pain or shortness of breath, please return to the hospital.  # If your blood pressure is equal to or greater than 150/100 (either one), please call your obstetrician.  # Please schedule an appointment at your physician office in one week. Take Motrin 600mg every 6 hours and/or tylenol 650mg every 6 hours as needed for pain. Call your OBGYN to schedule a follow up appointment in 1 week. Keep incision site clean and dry. Call your OBGYN if you experiences severe abdominal pain not improved by oral pain medications, heavy bright red vaginal bleeding saturating more than 1 pad per hour, red /warmth at incision site, drainage from incision site, or fever greater than 100.4F.

## 2025-03-16 NOTE — PROGRESS NOTE ADULT - SUBJECTIVE AND OBJECTIVE BOX
Patient evaluated at bedside this morning, resting comfortable in bed, with no acute events overnight.  She reports pain is well controlled with oral pain medications.   She denies headache, dizziness, chest pain, palpitations, shortness of breath, nausea, vomiting or heavy vaginal bleeding.  She is ambulating and voiding.     Physical Exam:  Vital Signs Last 24 Hrs  T(C): 36.6 (14 Mar 2025 02:15), Max: 37.2 (13 Mar 2025 05:10)  T(F): 97.9 (14 Mar 2025 02:15), Max: 99 (13 Mar 2025 05:10)  HR: 89 (14 Mar 2025 02:15) (70 - 89)  BP: 118/67 (14 Mar 2025 02:15) (100/74 - 135/82)  BP(mean): 91 (13 Mar 2025 05:55) (78 - 94)  RR: 18 (14 Mar 2025 02:15) (16 - 18)  SpO2: 97% (14 Mar 2025 02:15) (96% - 99%)    Parameters below as of 13 Mar 2025 14:00  Patient On (Oxygen Delivery Method): room air        GA: NAD, A+0 x 3  Pulm: no increased WOB  Abd: soft, nontender, nondistended, no rebound or guarding, incision with provena that is well suctioned, uterus firm at midline, 2 fb below umbilicus  : voiding  Extremities: no swelling or calf tenderness, SCDs in place                            10.4   9.92  )-----------( 204      ( 12 Mar 2025 07:43 )             32.7     03-12    135  |  102  |  7   ----------------------------<  80  3.9   |  20[L]  |  0.50    Ca    8.8      12 Mar 2025 08:00    TPro  7.1  /  Alb  3.6  /  TBili  0.2  /  DBili  x   /  AST  16  /  ALT  9[L]  /  AlkPhos  237[H]  03-12      PT/INR - ( 12 Mar 2025 08:36 )   PT: 10.1 sec;   INR: 0.86          PTT - ( 12 Mar 2025 08:36 )  PTT:25.3 sec  acetaminophen     Tablet .. 975 milliGRAM(s) Oral <User Schedule>  chlorhexidine 2% Cloths 1 Application(s) Topical daily  diphenhydrAMINE 25 milliGRAM(s) Oral every 6 hours PRN  diphtheria/tetanus/pertussis (acellular) Vaccine (Adacel) 0.5 milliLiter(s) IntraMuscular once  enoxaparin Injectable 40 milliGRAM(s) SubCutaneous every 24 hours  fentanyl (2 MICROgram(s)/mL) + bupivacaine 0.0625%  in 0.9% Sodium Chloride PCEA 250 milliLiter(s) Epidural PCA Continuous  ibuprofen  Tablet. 600 milliGRAM(s) Oral every 6 hours  influenza   Vaccine 0.5 milliLiter(s) IntraMuscular once  lactated ringers. 1000 milliLiter(s) IV Continuous <Continuous>  lanolin Ointment 1 Application(s) Topical every 6 hours PRN  magnesium hydroxide Suspension 30 milliLiter(s) Oral two times a day PRN  oxyCODONE    IR 5 milliGRAM(s) Oral every 3 hours PRN  oxyCODONE    IR 5 milliGRAM(s) Oral once PRN  oxytocin Infusion 42 milliUNIT(s)/Min IV Continuous <Continuous>  simethicone 80 milliGRAM(s) Chew every 4 hours PRN  
Pt without complaints
pt pushing for 2.5 hours no fetal descent pt chooses to do   140 moderate ltv no decles
Patient evaluated at bedside this morning, resting comfortable in bed.   She reports pain is well controlled with oral pain medications.   She denies headache, dizziness, chest pain, palpitations, shortness of breath, nausea, vomiting or heavy vaginal bleeding.  She has been ambulating without assistance, voiding spontaneously, passing gas, tolerating regular diet.     Physical Exam:  Vital Signs Last 24 Hrs  T(C): 36.4 (15 Mar 2025 02:00), Max: 37.4 (14 Mar 2025 14:00)  T(F): 97.6 (15 Mar 2025 02:00), Max: 99.3 (14 Mar 2025 14:00)  HR: 84 (15 Mar 2025 02:00) (74 - 84)  BP: 123/81 (15 Mar 2025 02:00) (107/75 - 123/81)  BP(mean): --  RR: 18 (15 Mar 2025 02:00) (17 - 18)  SpO2: 98% (15 Mar 2025 02:00) (96% - 98%)    Parameters below as of 15 Mar 2025 02:00  Patient On (Oxygen Delivery Method): room air        GA: NAD, A+0 x 3  Pulm: no increased WOB  Abd: soft, nontender, nondistended, no rebound or guarding, incision with provena which is dry and intact, uterus firm at midline, 2 fb below umbilicus  Extremities: no swelling or calf tenderness                             6.5    12.27 )-----------( 154      ( 14 Mar 2025 05:30 )             20.8               
Patient evaluated at bedside this morning, resting comfortable in bed. Pain is well controlled/ minimal.  She denies heavy vaginal bleeding, now pinkish.  She has been ambulating without assistance, voiding spontaneously, passing gas, tolerating regular diet.     Physical Exam:  Vital Signs Last 24 Hrs  T(C): 36.5 (16 Mar 2025 05:38), Max: 37 (15 Mar 2025 10:57)  T(F): 97.7 (16 Mar 2025 05:38), Max: 98.6 (15 Mar 2025 10:57)  HR: 76 (16 Mar 2025 05:38) (70 - 80)  BP: 129/88 (16 Mar 2025 05:38) (108/71 - 129/88)  BP(mean): --  RR: 18 (16 Mar 2025 05:38) (18 - 18)  SpO2: 98% (16 Mar 2025 05:38) (96% - 100%)    Parameters below as of 16 Mar 2025 05:38  Patient On (Oxygen Delivery Method): room air        GA: well-appearing, NAD  Pulm: no increased WOB  Abd: soft, nontender, no rebound or guarding, incision clean, dry and intact, uterus firm at midline,  fb below umbilicus  Extremities: no calf tenderness

## 2025-03-16 NOTE — DISCHARGE NOTE OB - PATIENT PORTAL LINK FT
You can access the FollowMyHealth Patient Portal offered by [facility] by registering at the following website: [email or web address]. By joining [employer]’s FollowMyHealth portal, you will also be able to view your health information using other applications (apps) compatible with our system.

## 2025-03-16 NOTE — DISCHARGE NOTE OB - CARE PLAN
Principal Discharge DX:	Postpartum state  Secondary Diagnosis:	Preeclampsia   1 Principal Discharge DX:	Postpartum state  Assessment and plan of treatment:	Take Motrin 600mg every 6 hours and/or tylenol 650mg every 6 hours as needed for pain. Call your OBGYN to schedule a follow up appointment in 1 week. Keep incision site clean and dry. Call your OBGYN if you experiences severe abdominal pain not improved by oral pain medications, heavy bright red vaginal bleeding saturating more than 1 pad per hour, red /warmth at incision site, drainage from incision site, or fever greater than 100.4F.  Secondary Diagnosis:	Preeclampsia  Assessment and plan of treatment:	# Monitor blood pressure three times a day. Please document the blood pressure values to review with obstetrician at follow-up appointment.   # If your blood pressure is equal to or greater than 160/110 (either one) OR if you experience any of the following symptoms: changes in vision, headache not relieved with Tylenol, severe abdominal pain, vomiting, increased vaginal bleeding, chest pain or shortness of breath, please return to the hospital.  # If your blood pressure is equal to or greater than 150/100 (either one), please call your obstetrician.  # Please schedule an appointment at your physician office in one week.

## 2025-03-17 PROCEDURE — 80053 COMPREHEN METABOLIC PANEL: CPT

## 2025-03-17 PROCEDURE — 88307 TISSUE EXAM BY PATHOLOGIST: CPT

## 2025-03-17 PROCEDURE — 86900 BLOOD TYPING SEROLOGIC ABO: CPT

## 2025-03-17 PROCEDURE — 59050 FETAL MONITOR W/REPORT: CPT

## 2025-03-17 PROCEDURE — 84550 ASSAY OF BLOOD/URIC ACID: CPT

## 2025-03-17 PROCEDURE — 85730 THROMBOPLASTIN TIME PARTIAL: CPT

## 2025-03-17 PROCEDURE — 86901 BLOOD TYPING SEROLOGIC RH(D): CPT

## 2025-03-17 PROCEDURE — 84156 ASSAY OF PROTEIN URINE: CPT

## 2025-03-17 PROCEDURE — 85610 PROTHROMBIN TIME: CPT

## 2025-03-17 PROCEDURE — 83615 LACTATE (LD) (LDH) ENZYME: CPT

## 2025-03-17 PROCEDURE — 86850 RBC ANTIBODY SCREEN: CPT

## 2025-03-17 PROCEDURE — 85025 COMPLETE CBC W/AUTO DIFF WBC: CPT

## 2025-03-17 PROCEDURE — 86780 TREPONEMA PALLIDUM: CPT

## 2025-03-17 PROCEDURE — 82570 ASSAY OF URINE CREATININE: CPT

## 2025-03-17 PROCEDURE — 36415 COLL VENOUS BLD VENIPUNCTURE: CPT

## 2025-03-20 DIAGNOSIS — O32.4XX0 MATERNAL CARE FOR HIGH HEAD AT TERM, NOT APPLICABLE OR UNSPECIFIED: ICD-10-CM

## 2025-03-20 DIAGNOSIS — D62 ACUTE POSTHEMORRHAGIC ANEMIA: ICD-10-CM

## 2025-03-20 DIAGNOSIS — Z3A.40 40 WEEKS GESTATION OF PREGNANCY: ICD-10-CM

## 2025-03-24 NOTE — OB RN DELIVERY SUMMARY - NS_RNDELIVATTEST_OBGYN_ALL_OB
OB Provider reported that the vagina was inspected and no foreign body was found/Laps, needles and instrument count was correct
No

## 2025-07-29 NOTE — OB RN INTRAOPERATIVE NOTE - NS_DECISIONCESAREAN_OBGYN_ALL_OB_DT
Brought to PACU from OR. Report received from CRNA and OR RN. VSS. Easily arousable to name. Denies pain/nausea. Mesh panties and drsgs d/I. Cont to monitor.    13-Mar-2025 02:45